# Patient Record
Sex: FEMALE | Race: WHITE | NOT HISPANIC OR LATINO
[De-identification: names, ages, dates, MRNs, and addresses within clinical notes are randomized per-mention and may not be internally consistent; named-entity substitution may affect disease eponyms.]

---

## 2019-06-26 ENCOUNTER — APPOINTMENT (OUTPATIENT)
Dept: GASTROENTEROLOGY | Facility: CLINIC | Age: 74
End: 2019-06-26
Payer: MEDICARE

## 2019-06-26 VITALS
SYSTOLIC BLOOD PRESSURE: 130 MMHG | DIASTOLIC BLOOD PRESSURE: 84 MMHG | HEIGHT: 60 IN | WEIGHT: 114 LBS | BODY MASS INDEX: 22.38 KG/M2 | OXYGEN SATURATION: 96 % | TEMPERATURE: 98.2 F | HEART RATE: 90 BPM

## 2019-06-26 DIAGNOSIS — Z87.19 PERSONAL HISTORY OF OTHER DISEASES OF THE DIGESTIVE SYSTEM: ICD-10-CM

## 2019-06-26 PROCEDURE — 99203 OFFICE O/P NEW LOW 30 MIN: CPT

## 2019-06-26 RX ORDER — LISINOPRIL 40 MG/1
40 TABLET ORAL
Refills: 0 | Status: ACTIVE | COMMUNITY

## 2019-06-26 RX ORDER — ALENDRONATE SODIUM 70 MG/1
70 TABLET ORAL
Refills: 0 | Status: ACTIVE | COMMUNITY

## 2019-06-26 RX ORDER — LACTOBACILLUS RHAMNOSUS GG 10B CELL
CAPSULE ORAL
Refills: 0 | Status: ACTIVE | COMMUNITY

## 2019-06-26 RX ORDER — LEVOTHYROXINE SODIUM 137 UG/1
TABLET ORAL
Refills: 0 | Status: ACTIVE | COMMUNITY

## 2019-06-26 RX ORDER — AMLODIPINE BESYLATE 5 MG/1
5 TABLET ORAL
Refills: 0 | Status: ACTIVE | COMMUNITY

## 2019-06-26 NOTE — ASSESSMENT
[FreeTextEntry1] : Impression and plan\par \par Patient presents to the office today with gassy bloating and uncomfortable feeling in the abdomen. I will recommend further testing in the form of CT scan to rule out unsuspected pathology pending this I will plan for colonoscopy. Patient is currently undergoing treatment for temporal arteritis and as such I would like to hold off on colonoscopy until she has completed her course of steroids. Patient will keep me posted regarding symptoms and I will update when colonoscopy is complete

## 2019-06-26 NOTE — HISTORY OF PRESENT ILLNESS
[FreeTextEntry1] : Patient came to the office today with complaints of gas and bloating abdominal distention and midline abdominal discomfort which has been going on for many weeks. Patient notes that she has some irregular bowel movements and was concerned about this. Patient denies nausea vomiting fever chills rectal bleeding or melena. She is desirous of a screening colonoscopy.

## 2019-06-26 NOTE — PHYSICAL EXAM
[General Appearance - Well Nourished] : well nourished [General Appearance - Well-Appearing] : healthy appearing [Sclera] : the sclera and conjunctiva were normal [PERRL With Normal Accommodation] : pupils were equal in size, round, and reactive to light [Extraocular Movements] : extraocular movements were intact [Outer Ear] : the ears and nose were normal in appearance [Oropharynx] : the oropharynx was normal [Neck Appearance] : the appearance of the neck was normal [Neck Cervical Mass (___cm)] : no neck mass was observed [Jugular Venous Distention Increased] : there was no jugular-venous distention [Thyroid Diffuse Enlargement] : the thyroid was not enlarged [Thyroid Nodule] : there were no palpable thyroid nodules [Auscultation Breath Sounds / Voice Sounds] : lungs were clear to auscultation bilaterally [Heart Rate And Rhythm] : heart rate was normal and rhythm regular [Heart Sounds] : normal S1 and S2 [Heart Sounds Gallop] : no gallops [Murmurs] : no murmurs [Heart Sounds Pericardial Friction Rub] : no pericardial rub [Full Pulse] : the pedal pulses are present [Edema] : there was no peripheral edema [Breast Appearance] : normal in appearance [Breast Palpation Mass] : no palpable masses [Bowel Sounds] : normal bowel sounds [Abdomen Soft] : soft [Abdomen Tenderness] : non-tender [Abdomen Mass (___ Cm)] : no abdominal mass palpated [FreeTextEntry1] : There is soft distention no masses palpated there is a suggestion of fluid wave [Cervical Lymph Nodes Enlarged Posterior Bilaterally] : posterior cervical [Cervical Lymph Nodes Enlarged Anterior Bilaterally] : anterior cervical [No CVA Tenderness] : no ~M costovertebral angle tenderness [No Spinal Tenderness] : no spinal tenderness [Abnormal Walk] : normal gait [Nail Clubbing] : no clubbing  or cyanosis of the fingernails [Musculoskeletal - Swelling] : no joint swelling seen [Motor Tone] : muscle strength and tone were normal [Skin Color & Pigmentation] : normal skin color and pigmentation [Skin Turgor] : normal skin turgor [] : no rash [Deep Tendon Reflexes (DTR)] : deep tendon reflexes were 2+ and symmetric [Sensation] : the sensory exam was normal to light touch and pinprick [No Focal Deficits] : no focal deficits [Oriented To Time, Place, And Person] : oriented to person, place, and time [Impaired Insight] : insight and judgment were intact [Affect] : the affect was normal

## 2019-07-03 ENCOUNTER — CHART COPY (OUTPATIENT)
Age: 74
End: 2019-07-03

## 2020-09-03 ENCOUNTER — APPOINTMENT (OUTPATIENT)
Dept: GASTROENTEROLOGY | Facility: CLINIC | Age: 75
End: 2020-09-03
Payer: MEDICARE

## 2020-09-03 VITALS
BODY MASS INDEX: 22.78 KG/M2 | HEIGHT: 60 IN | WEIGHT: 116 LBS | HEART RATE: 106 BPM | TEMPERATURE: 98 F | OXYGEN SATURATION: 95 % | SYSTOLIC BLOOD PRESSURE: 120 MMHG | DIASTOLIC BLOOD PRESSURE: 70 MMHG

## 2020-09-03 DIAGNOSIS — Z00.00 ENCOUNTER FOR GENERAL ADULT MEDICAL EXAMINATION W/OUT ABNORMAL FINDINGS: ICD-10-CM

## 2020-09-03 PROCEDURE — 99214 OFFICE O/P EST MOD 30 MIN: CPT

## 2020-09-03 NOTE — HISTORY OF PRESENT ILLNESS
[FreeTextEntry1] : Patient presents to the office today for evaluation and discussion of recently discovered elevated liver function testing. Patient was recently admitted and discharged from [facility] for small bowel obstruction. She underwent exploratory laparotomy with lysis of adhesions. The patient presented with a series of blood work results demonstrating normal liver function studies back in February of this year and more recently modest elevation of transaminases along with slightly elevated alkaline phosphatase. This has been persistent on sequential testing. The patient is currently asymptomatic with no complaints of nausea vomiting fever chills rectal bleeding or melena. She denies jaundice dark urine or pruritus patient has no current cardiac or pulmonary complaints. She admits to drinking 3 glasses of wine per night. There is a suggestion of fatty infiltration of the liver seen on current study not seen on previous CT one year ago.

## 2020-09-03 NOTE — ASSESSMENT
[FreeTextEntry1] : Impression\par \par Patient was recently admitted and discharged from [facility] approximately 60 days ago after admission for small bowel obstruction. She underwent exploratory laparotomy with lysis of adhesions.  She is feeling better in this regard and is healing nicely. She is noted to have elevated alkaline phosphatase and slight elevation of transaminase level on sequential blood work. Etiology is uncertain. Abdominal CT scan performed on admission did demonstrate fatty infiltration of the liver but no evidence of ductal dilatation or stone. I will investigate further with some additional blood work and obtain MRI MRCP of the abdomen to evaluate the biliary tree. Patient will call back after above testing is obtained. Further suggestions will follow. Patient was counseled to avoid alcohol

## 2020-09-04 LAB
FERRITIN SERPL-MCNC: 186 NG/ML
HBV CORE IGG+IGM SER QL: NONREACTIVE
HBV CORE IGM SER QL: NONREACTIVE
HCV AB SER QL: NONREACTIVE
HCV S/CO RATIO: 0.09 S/CO
IRON SERPL-MCNC: 144 UG/DL

## 2020-09-15 ENCOUNTER — OUTPATIENT (OUTPATIENT)
Dept: OUTPATIENT SERVICES | Facility: HOSPITAL | Age: 75
LOS: 1 days | End: 2020-09-15
Payer: MEDICARE

## 2020-09-15 ENCOUNTER — APPOINTMENT (OUTPATIENT)
Dept: MRI IMAGING | Facility: CLINIC | Age: 75
End: 2020-09-15
Payer: MEDICARE

## 2020-09-15 DIAGNOSIS — Z90.710 ACQUIRED ABSENCE OF BOTH CERVIX AND UTERUS: Chronic | ICD-10-CM

## 2020-09-15 DIAGNOSIS — Z98.89 OTHER SPECIFIED POSTPROCEDURAL STATES: Chronic | ICD-10-CM

## 2020-09-15 DIAGNOSIS — Z98.41 CATARACT EXTRACTION STATUS, RIGHT EYE: Chronic | ICD-10-CM

## 2020-09-15 DIAGNOSIS — Z00.00 ENCOUNTER FOR GENERAL ADULT MEDICAL EXAMINATION WITHOUT ABNORMAL FINDINGS: ICD-10-CM

## 2020-09-15 DIAGNOSIS — K66.0 PERITONEAL ADHESIONS (POSTPROCEDURAL) (POSTINFECTION): Chronic | ICD-10-CM

## 2020-09-15 PROCEDURE — 74183 MRI ABD W/O CNTR FLWD CNTR: CPT | Mod: 26

## 2020-09-15 PROCEDURE — A9585: CPT

## 2020-09-15 PROCEDURE — 74183 MRI ABD W/O CNTR FLWD CNTR: CPT

## 2020-09-16 LAB
ANA SER IF-ACNC: NEGATIVE
MITOCHONDRIA AB SER IF-ACNC: NORMAL

## 2020-10-01 ENCOUNTER — APPOINTMENT (OUTPATIENT)
Dept: GASTROENTEROLOGY | Facility: CLINIC | Age: 75
End: 2020-10-01
Payer: MEDICARE

## 2020-10-01 VITALS
SYSTOLIC BLOOD PRESSURE: 120 MMHG | WEIGHT: 115 LBS | BODY MASS INDEX: 22.58 KG/M2 | DIASTOLIC BLOOD PRESSURE: 80 MMHG | TEMPERATURE: 97.9 F | HEIGHT: 60 IN | HEART RATE: 84 BPM | OXYGEN SATURATION: 96 %

## 2020-10-01 PROCEDURE — 99213 OFFICE O/P EST LOW 20 MIN: CPT

## 2020-10-01 NOTE — ASSESSMENT
[FreeTextEntry1] : Impression and plan patient returns for follow up and discussion. Since her last visit she has been feeling well and is without complaint. There has been a slight drop in her transaminase level and alkaline phosphatase is near normal. MRI scan was reviewed including small pancreatic cyst that will require surveillance in about a year or so. Patient will see me back in about a month for ongoing blood work. At this point there is no obvious causation for her elevated transaminase levels. She continues to hold cholesterol medication and avoids alcohol. She takes no over-the-counter medications or supplements. Further suggestions in one month and hepatology visit may be required if continued elevation.

## 2020-11-03 ENCOUNTER — LABORATORY RESULT (OUTPATIENT)
Age: 75
End: 2020-11-03

## 2020-11-03 ENCOUNTER — APPOINTMENT (OUTPATIENT)
Dept: GASTROENTEROLOGY | Facility: CLINIC | Age: 75
End: 2020-11-03
Payer: MEDICARE

## 2020-11-03 VITALS
SYSTOLIC BLOOD PRESSURE: 150 MMHG | HEART RATE: 90 BPM | BODY MASS INDEX: 23.36 KG/M2 | DIASTOLIC BLOOD PRESSURE: 80 MMHG | OXYGEN SATURATION: 96 % | WEIGHT: 119 LBS | TEMPERATURE: 98 F | HEIGHT: 60 IN

## 2020-11-03 DIAGNOSIS — Z87.39 PERSONAL HISTORY OF OTHER DISEASES OF THE MUSCULOSKELETAL SYSTEM AND CONNECTIVE TISSUE: ICD-10-CM

## 2020-11-03 PROCEDURE — 99213 OFFICE O/P EST LOW 20 MIN: CPT

## 2020-11-03 NOTE — HISTORY OF PRESENT ILLNESS
[FreeTextEntry1] : Patient returned for followup and discussion. She is feeling well offers no complaints. She has discontinued her cholesterol medication. She currently denies nausea vomiting fever chills rectal bleeding or melena. We took time to review recent MRI demonstrating small cyst repeat blood work will be drawn today.

## 2020-11-03 NOTE — ASSESSMENT
[FreeTextEntry1] : Impression and\par \par Patient came to the office today for followup. I will recheck blood work. Patient feels well and offers no present complaints. Pancreatic cyst is small and appears to be benign based upon MRI report. However I will recommend surgical consultation with Dr. Bam Arizmendi at [facility]. Patient will make an appointment at her convenience and call back for results of blood work drawn today.

## 2020-11-04 ENCOUNTER — NON-APPOINTMENT (OUTPATIENT)
Age: 75
End: 2020-11-04

## 2020-11-04 LAB
ALBUMIN SERPL ELPH-MCNC: 4.7 G/DL
ALP BLD-CCNC: 198 U/L
ALT SERPL-CCNC: 75 U/L
ANION GAP SERPL CALC-SCNC: 12 MMOL/L
AST SERPL-CCNC: 24 U/L
BILIRUB SERPL-MCNC: 0.4 MG/DL
BUN SERPL-MCNC: 15 MG/DL
CALCIUM SERPL-MCNC: 9.6 MG/DL
CHLORIDE SERPL-SCNC: 103 MMOL/L
CO2 SERPL-SCNC: 26 MMOL/L
CREAT SERPL-MCNC: 0.69 MG/DL
GLUCOSE SERPL-MCNC: 124 MG/DL
POTASSIUM SERPL-SCNC: 4.5 MMOL/L
PROT SERPL-MCNC: 6.5 G/DL
SODIUM SERPL-SCNC: 141 MMOL/L

## 2020-11-05 ENCOUNTER — NON-APPOINTMENT (OUTPATIENT)
Age: 75
End: 2020-11-05

## 2021-02-02 ENCOUNTER — NON-APPOINTMENT (OUTPATIENT)
Age: 76
End: 2021-02-02

## 2021-02-10 ENCOUNTER — NON-APPOINTMENT (OUTPATIENT)
Age: 76
End: 2021-02-10

## 2021-03-04 ENCOUNTER — NON-APPOINTMENT (OUTPATIENT)
Age: 76
End: 2021-03-04

## 2022-07-12 ENCOUNTER — APPOINTMENT (OUTPATIENT)
Dept: HEPATOLOGY | Facility: CLINIC | Age: 77
End: 2022-07-12

## 2022-07-12 ENCOUNTER — LABORATORY RESULT (OUTPATIENT)
Age: 77
End: 2022-07-12

## 2022-07-12 VITALS
TEMPERATURE: 97.5 F | HEIGHT: 60 IN | HEART RATE: 99 BPM | SYSTOLIC BLOOD PRESSURE: 120 MMHG | BODY MASS INDEX: 23.75 KG/M2 | DIASTOLIC BLOOD PRESSURE: 90 MMHG | OXYGEN SATURATION: 96 % | WEIGHT: 121 LBS

## 2022-07-12 PROCEDURE — 99215 OFFICE O/P EST HI 40 MIN: CPT

## 2022-07-12 RX ORDER — VITAMIN A 10000 UNIT
TABLET ORAL
Refills: 0 | Status: ACTIVE | COMMUNITY

## 2022-07-12 RX ORDER — PREDNISONE 10 MG
TABLET ORAL
Refills: 0 | Status: ACTIVE | COMMUNITY

## 2022-07-12 RX ORDER — UMECLIDINIUM BROMIDE AND VILANTEROL TRIFENATATE 62.5; 25 UG/1; UG/1
62.5-25 POWDER RESPIRATORY (INHALATION)
Qty: 60 | Refills: 0 | Status: ACTIVE | COMMUNITY
Start: 2022-06-02

## 2022-07-12 RX ORDER — ATORVASTATIN CALCIUM 40 MG/1
40 TABLET, FILM COATED ORAL
Refills: 0 | Status: DISCONTINUED | COMMUNITY
End: 2022-07-12

## 2022-07-12 RX ORDER — PREDNISOLONE ACETATE 10 MG/ML
1 SUSPENSION/ DROPS OPHTHALMIC
Qty: 5 | Refills: 0 | Status: DISCONTINUED | COMMUNITY
Start: 2022-02-25

## 2022-07-12 RX ORDER — LISINOPRIL 20 MG/1
20 TABLET ORAL
Qty: 180 | Refills: 0 | Status: DISCONTINUED | COMMUNITY
Start: 2021-08-05

## 2022-07-12 RX ORDER — ASCORBIC ACID 500 MG
TABLET ORAL
Refills: 0 | Status: ACTIVE | COMMUNITY

## 2022-07-12 RX ORDER — UBIDECARENONE/VIT E ACET 100MG-5
CAPSULE ORAL
Refills: 0 | Status: ACTIVE | COMMUNITY

## 2022-07-12 RX ORDER — PANTOPRAZOLE 40 MG/1
40 TABLET, DELAYED RELEASE ORAL
Refills: 0 | Status: DISCONTINUED | COMMUNITY
End: 2022-07-12

## 2022-07-12 NOTE — ASSESSMENT
[FreeTextEntry1] : Kavita Chin 76 yoF with hypothyroidism, HLD, HTN, polymyalgia rheumatica and temporal arteritis   here for evaluation of elevated liver enzymes \par \par #Elevated liver enzymes\par -intermittent elevation of transaminases along with mildly elevated alkaline phosphatase since June 2020, was normal prior.   Recently had BW on 6/2/22 ALT 88, AST 57,  when they were normal in March 2022. Abdo CT July 2020 suggestive of fatty infiltration of the liver however not seen on abdo MRI from Sept 2020. No new meds or supplement in the last 6 months. She drinks 2-3 glasses of wine daily for about 25 yrs.\par - I have recommended that we do a complete liver evaluation \par -MRE ordered for non-invasive estimation of degree of steatosis and stage of fibrosis \par \par #Pancreatic cyst \par Abdo MRI 9/15/20 showed 0.7 cm cystic lesion in the uncinate process. Recommended repeat MRCP.  \par \par Plan:\par BW today, MRCP and MRE, F/U in 3-4 weeks \par \par

## 2022-07-12 NOTE — PHYSICAL EXAM
[Scleral Icterus] : No Scleral Icterus [Abdominal  Ascites] : no ascites [Asterixis] : no asterixis observed [Jaundice] : No jaundice [Palmar Erythema] : no Palmar Erythema [General Appearance - Alert] : alert [General Appearance - In No Acute Distress] : in no acute distress [Sclera] : the sclera and conjunctiva were normal [Neck Appearance] : the appearance of the neck was normal [Neck Cervical Mass (___cm)] : no neck mass was observed [] : no respiratory distress [Respiration, Rhythm And Depth] : normal respiratory rhythm and effort [Auscultation Breath Sounds / Voice Sounds] : lungs were clear to auscultation bilaterally [Heart Rate And Rhythm] : heart rate was normal and rhythm regular [Heart Sounds] : normal S1 and S2 [Edema] : there was no peripheral edema [Bowel Sounds] : normal bowel sounds [Abdomen Soft] : soft [Abdomen Tenderness] : non-tender [Nail Clubbing] : no clubbing  or cyanosis of the fingernails [Skin Turgor] : normal skin turgor [Oriented To Time, Place, And Person] : oriented to person, place, and time

## 2022-07-12 NOTE — REVIEW OF SYSTEMS
[Fever] : no fever [Chills] : no chills [Abdominal Pain] : no abdominal pain [Melena] : no melena [Dysuria] : no dysuria [Arthralgias] : arthralgias [Itching] : no itching [Confused] : no confusion [Negative] : Respiratory

## 2022-07-12 NOTE — HISTORY OF PRESENT ILLNESS
[de-identified] : Kavita Chin 76 yoF with hypothyroidism, HLD, HTN, polymyalgia rheumatica and temporal arteritis   here for evaluation of elevated liver enzymes referred by Dr. Michael Mckee. She has intermittent elevation of transaminases along with mildly elevated alkaline phosphatase since June 2020, was normal prior. She was advised in 2020 to stop Lipitor with improvement.  Recently had BW on 6/2/22 ALT 88, AST 57,  when they were normal in March 2022. Past BW showed normal AMA, OTTO, ceruloplasmin, Was neg for hep B and C. . \par \par No recent illness. Meds/supplements reviewed and listed. No new meds in the last 6 months. Was taking prednisone 0.5 mg daily for temporal arteritis but was increased to 20 mg daily in May now at 5 mg daily.  Former smoker. No recreational drug use. No family h/o liver disease or liver cancer.

## 2022-07-13 LAB
A1AT SERPL-MCNC: 94 MG/DL
AFP-TM SERPL-MCNC: 13.3 NG/ML
ALBUMIN SERPL ELPH-MCNC: 4.7 G/DL
ALP BLD-CCNC: 118 U/L
ALT SERPL-CCNC: 24 U/L
ANION GAP SERPL CALC-SCNC: 16 MMOL/L
AST SERPL-CCNC: 17 U/L
BASOPHILS # BLD AUTO: 0.05 K/UL
BASOPHILS NFR BLD AUTO: 0.5 %
BILIRUB DIRECT SERPL-MCNC: 0.1 MG/DL
BILIRUB SERPL-MCNC: 0.3 MG/DL
BUN SERPL-MCNC: 27 MG/DL
CALCIUM SERPL-MCNC: 10.2 MG/DL
CERULOPLASMIN SERPL-MCNC: 27 MG/DL
CHLORIDE SERPL-SCNC: 105 MMOL/L
CO2 SERPL-SCNC: 23 MMOL/L
CREAT SERPL-MCNC: 0.78 MG/DL
EGFR: 78 ML/MIN/1.73M2
EOSINOPHIL # BLD AUTO: 0.07 K/UL
EOSINOPHIL NFR BLD AUTO: 0.7 %
FERRITIN SERPL-MCNC: 395 NG/ML
GGT SERPL-CCNC: 177 U/L
HBV CORE IGG+IGM SER QL: NONREACTIVE
HBV SURFACE AB SER QL: NONREACTIVE
HBV SURFACE AG SER QL: NONREACTIVE
HCT VFR BLD CALC: 49 %
HCV AB SER QL: NONREACTIVE
HCV S/CO RATIO: 0.08 S/CO
HEPATITIS A IGG ANTIBODY: REACTIVE
HGB BLD-MCNC: 15.9 G/DL
IGA SER QL IEP: 185 MG/DL
IGG SER QL IEP: 615 MG/DL
IGM SER QL IEP: 119 MG/DL
IMM GRANULOCYTES NFR BLD AUTO: 0.2 %
INR PPP: 0.87 RATIO
IRON SATN MFR SERPL: 52 %
IRON SERPL-MCNC: 142 UG/DL
LYMPHOCYTES # BLD AUTO: 1.25 K/UL
LYMPHOCYTES NFR BLD AUTO: 12.7 %
MAN DIFF?: NORMAL
MCHC RBC-ENTMCNC: 32.4 GM/DL
MCHC RBC-ENTMCNC: 34.5 PG
MCV RBC AUTO: 106.3 FL
MONOCYTES # BLD AUTO: 0.74 K/UL
MONOCYTES NFR BLD AUTO: 7.5 %
NEUTROPHILS # BLD AUTO: 7.68 K/UL
NEUTROPHILS NFR BLD AUTO: 78.4 %
PLATELET # BLD AUTO: 360 K/UL
POTASSIUM SERPL-SCNC: 4.1 MMOL/L
PROT SERPL-MCNC: 6.9 G/DL
PT BLD: 10.1 SEC
RBC # BLD: 4.61 M/UL
RBC # FLD: 14 %
SODIUM SERPL-SCNC: 144 MMOL/L
TIBC SERPL-MCNC: 272 UG/DL
UIBC SERPL-MCNC: 130 UG/DL
WBC # FLD AUTO: 9.81 K/UL

## 2022-07-14 LAB
ACE BLD-CCNC: <5 U/L
ANA SER IF-ACNC: NEGATIVE
LKM AB SER QL IF: <20.1 UNITS
MITOCHONDRIA AB SER IF-ACNC: NORMAL
SMOOTH MUSCLE AB SER QL IF: NORMAL

## 2022-07-18 LAB — PHOSPHATIDYETHANOL (PETH), WHOLE BLOOD: 203 NG/ML

## 2022-07-19 LAB — SOLUBLE LIVER IGG SER IA-ACNC: 0.5

## 2022-07-20 LAB
HEPATITIS E IGM ABY: NOT DETECTED
TM INTERPRETATION: NORMAL

## 2022-08-08 ENCOUNTER — APPOINTMENT (OUTPATIENT)
Dept: MRI IMAGING | Facility: IMAGING CENTER | Age: 77
End: 2022-08-08

## 2022-08-08 ENCOUNTER — OUTPATIENT (OUTPATIENT)
Dept: OUTPATIENT SERVICES | Facility: HOSPITAL | Age: 77
LOS: 1 days | End: 2022-08-08
Payer: MEDICARE

## 2022-08-08 DIAGNOSIS — Z90.710 ACQUIRED ABSENCE OF BOTH CERVIX AND UTERUS: Chronic | ICD-10-CM

## 2022-08-08 DIAGNOSIS — Z98.89 OTHER SPECIFIED POSTPROCEDURAL STATES: Chronic | ICD-10-CM

## 2022-08-08 DIAGNOSIS — Z98.41 CATARACT EXTRACTION STATUS, RIGHT EYE: Chronic | ICD-10-CM

## 2022-08-08 DIAGNOSIS — R74.8 ABNORMAL LEVELS OF OTHER SERUM ENZYMES: ICD-10-CM

## 2022-08-08 DIAGNOSIS — K66.0 PERITONEAL ADHESIONS (POSTPROCEDURAL) (POSTINFECTION): Chronic | ICD-10-CM

## 2022-08-08 PROCEDURE — 74183 MRI ABD W/O CNTR FLWD CNTR: CPT | Mod: 26,MH

## 2022-08-08 PROCEDURE — A9585: CPT

## 2022-08-08 PROCEDURE — 76391 MR ELASTOGRAPHY: CPT

## 2022-08-08 PROCEDURE — 76391 MR ELASTOGRAPHY: CPT | Mod: 26

## 2022-08-08 PROCEDURE — 74183 MRI ABD W/O CNTR FLWD CNTR: CPT

## 2022-08-12 ENCOUNTER — APPOINTMENT (OUTPATIENT)
Dept: HEPATOLOGY | Facility: CLINIC | Age: 77
End: 2022-08-12

## 2022-08-12 VITALS
TEMPERATURE: 96.9 F | HEART RATE: 89 BPM | HEIGHT: 60 IN | OXYGEN SATURATION: 95 % | DIASTOLIC BLOOD PRESSURE: 80 MMHG | WEIGHT: 121.2 LBS | BODY MASS INDEX: 23.8 KG/M2 | SYSTOLIC BLOOD PRESSURE: 128 MMHG

## 2022-08-12 DIAGNOSIS — R79.89 OTHER SPECIFIED ABNORMAL FINDINGS OF BLOOD CHEMISTRY: ICD-10-CM

## 2022-08-12 DIAGNOSIS — R74.8 ABNORMAL LEVELS OF OTHER SERUM ENZYMES: ICD-10-CM

## 2022-08-12 DIAGNOSIS — R77.2 ABNORMALITY OF ALPHAFETOPROTEIN: ICD-10-CM

## 2022-08-12 PROCEDURE — 99213 OFFICE O/P EST LOW 20 MIN: CPT

## 2022-08-12 NOTE — HISTORY OF PRESENT ILLNESS
[de-identified] : Kavita Chin 76 yoF with hypothyroidism, HLD, HTN, polymyalgia rheumatica and temporal arteritis here to discuss recent liver workup for elevated liver enzymes \par \par 8/12/22: No new complaints. Has stopped drinking alcohol since she was last seen in July. \par \par 7/12/22 Initial visit: here for evaluation of elevated liver enzymes referred by Dr. Michael Mckee. She has intermittent elevation of transaminases along with mildly elevated alkaline phosphatase since June 2020, was normal prior. She was advised in 2020 to stop Lipitor with improvement.  Recently had BW on 6/2/22 ALT 88, AST 57,  when they were normal in March 2022. Past BW showed normal AMA, OTTO, ceruloplasmin, Was neg for hep B and C. . \par \par No recent illness. Meds/supplements reviewed and listed. No new meds in the last 6 months. Was taking prednisone 0.5 mg daily for temporal arteritis but was increased to 20 mg daily in May now at 5 mg daily. \par \par  Former smoker. No recreational drug use. She drinks 2-3 glasses of wine daily for about 25 yrs. No family h/o liver disease or liver cancer.

## 2022-08-12 NOTE — REVIEW OF SYSTEMS
[Arthralgias] : arthralgias [Negative] : Heme/Lymph [Fever] : no fever [Chills] : no chills [Abdominal Pain] : no abdominal pain [Melena] : no melena [Dysuria] : no dysuria [Itching] : no itching [Confused] : no confusion

## 2022-08-12 NOTE — ASSESSMENT
[FreeTextEntry1] : Kavita Corroon 76 yoF with hypothyroidism, HLD, HTN, polymyalgia rheumatica and temporal arteritis here to discuss recent liver evaluation for elevated liver enzymes \par \par #Elevated liver enzymes\par -intermittent elevation of transaminases along with mildly elevated alkaline phosphatase since 2020, was normal prior.    BW on 22 ALT 88, AST 57,  when they were normal in 2022. Liver workup from 22 showed normalization of liver enzymes without intervention. 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 Ferritin 395, TIBC 52%. MRE showed no iron deposition and no fibrosis. Discussed phlebotomy but patient doesn't want it so will continue to monitor. Advised to avoid supplemental iron, MVT with iron and foods fortified with iron. \par \par #Elevated AFP 13.3. MRI 22 showed 1 cm hepatic cyst, no suspicious lesion. Will repeat AFP in 3 months. \par \par #Pancreatic cyst \par Abdo MRI 9/15/20 showed 0.7 cm cystic lesion in the uncinate process. MRI 22 now showed two small pancreatic cysts, 7mm and 3 mm and mildly dilated side branch duct. Reports that her brother  of pancreatic ca. She was recommended by Dr. Michael Mckee in  surgical consultation but never did. I recommended that she be seen at our pancreatic center and she is amendable to this. Will contact our pancreatic team today. \par \par #Hepatic steatosis\par Has normal BMI. PEth elevated. Potentially related to alcohol. She drank 2-3 glasses of wine daily for about 25 yrs but reports that she has stopped since July. No fibrosis on MRE\par \par \par Plan:\par Repeat BW in 3 months and she will call me to discuss results. RTC 6 months. \par \par

## 2022-09-08 ENCOUNTER — APPOINTMENT (OUTPATIENT)
Dept: SURGICAL ONCOLOGY | Facility: CLINIC | Age: 77
End: 2022-09-08

## 2022-09-08 VITALS
HEIGHT: 59.5 IN | HEART RATE: 95 BPM | SYSTOLIC BLOOD PRESSURE: 132 MMHG | RESPIRATION RATE: 16 BRPM | OXYGEN SATURATION: 97 % | DIASTOLIC BLOOD PRESSURE: 79 MMHG | BODY MASS INDEX: 24.87 KG/M2 | TEMPERATURE: 97.7 F | WEIGHT: 125 LBS

## 2022-09-08 PROCEDURE — 99204 OFFICE O/P NEW MOD 45 MIN: CPT

## 2022-09-08 NOTE — ASSESSMENT
[FreeTextEntry1] : 76F referred for management of small pancreatic cysts. \par 7mm and 3 mm on MRI Aug 2022\par \par I discussed that pancreatic cysts can have a range of pathologies and although most are benign, there is a risk of malignancy. We discussed that the relatively small size, absence of main duct involvement, and lack of solid components of her cysts are reassuring, but there is still a small risk developing pancreatic cancer. This risk warrants continued surveillance as long as she is healthy enough to pursue any therapy- such as resection, chemotherapy, or radiation. \par \par In addition, her risk may be elevated based on first degree family members with pancreatic cancer (brother). We discussed that she meets criteria for genetic testing based on a first degree relative. If a germline mutation was identified recommendation would still be continued annual surveillance, and so would not necessarily change the intensity of her surveillance. \par \par I discussed symptoms that would raise my concern for malignant transformation including weight loss, jaundice, abdominal/ back pain, and new onset diabetes. Should these develop, she should call immediately. \par \par In the absence of new symptoms I recommend continued surveillance with annual MRI. I discussed that at some point in the future it may be worthwhile to consider endoscopic ultrasound as this modality may identify findings not apparent on MRI- such as occult mural nodules. I do not think this is necessary in the near future. I also discussed that our surveillance recommendations may change over time as the literature continues to evolve. \par \par Plan: \par Genetics referral \par Follow up with MRI in 1 year\par Follow up cyst clinic after MRI\par \par

## 2022-09-08 NOTE — HISTORY OF PRESENT ILLNESS
[de-identified] : 76F referred by Dr. Michelle Puente for management of small pancreatic cysts. \par ** Family history of pancreatic cancer in her brother-- diagnosed at age 78.\par Did not undergo genetic testing. \par \par She follows with hepatology for elevated liver enzymes. \par Recently stopped drinking alcohol. \par Hep B and C negative. \par \par MRI abdomen 9/15/20 0.7 cm cystic lesion uncinate process of pancreas\par MRI 8/8/22 2 small pancreatic cysts- 7mm, 3mm and mildly dilated side branch \par \par She takes prednisone for temporal arteritis. Has developed central obesity. She is down to 1 mg. Has to increase with flares. \par \par PMHx: \par hypothyroid\par hypertension\par hyperlipidemia\par ploymyalgia rheumatic \par foot trauma\par \par PSHx: \par Hysterectomy\par Ex lap X 2 for small bowel obstructive secondary to adhesions\par \par Medications: \par amodipine\par lisinopril\par Prednisone\par Synthroid\par Addiitonal medications reviewed

## 2022-10-20 ENCOUNTER — OUTPATIENT (OUTPATIENT)
Dept: OUTPATIENT SERVICES | Facility: HOSPITAL | Age: 77
LOS: 1 days | Discharge: HOME | End: 2022-10-20

## 2022-10-20 DIAGNOSIS — K66.0 PERITONEAL ADHESIONS (POSTPROCEDURAL) (POSTINFECTION): Chronic | ICD-10-CM

## 2022-10-20 DIAGNOSIS — Z98.89 OTHER SPECIFIED POSTPROCEDURAL STATES: Chronic | ICD-10-CM

## 2022-10-20 DIAGNOSIS — Z15.89 GENETIC SUSCEPTIBILITY TO OTHER DISEASE: ICD-10-CM

## 2022-10-20 DIAGNOSIS — Z90.710 ACQUIRED ABSENCE OF BOTH CERVIX AND UTERUS: Chronic | ICD-10-CM

## 2022-10-20 DIAGNOSIS — Z98.41 CATARACT EXTRACTION STATUS, RIGHT EYE: Chronic | ICD-10-CM

## 2022-11-08 ENCOUNTER — APPOINTMENT (OUTPATIENT)
Dept: HEMATOLOGY ONCOLOGY | Facility: CLINIC | Age: 77
End: 2022-11-08

## 2022-11-08 ENCOUNTER — LABORATORY RESULT (OUTPATIENT)
Age: 77
End: 2022-11-08

## 2022-11-10 NOTE — DISCUSSION/SUMMARY
[FreeTextEntry1] : REASON FOR CONSULT\par Kavita Chin is a 77-year-old female who was referred by Dr. Beverly Bustos for cancer genetic counseling and risk assessment due to a family history of pancreatic and other cancers. \par \par RELEVANT MEDICAL HISTORY\par Ms. Mo is a healthy individual who has never had cancer. Of note, she was found to have small pancreatic cysts on her recent MRI abdomen (2022). In addition, she has a family history of cancer, see below.\par \par OTHER MEDICAL AND SURGICAL HISTORY:\par Hysterectomy d/t heavy bleeding (; ovaries intact). Hypothyroidism. HTN. HLD. Crohn’s (). Right thyroidectomy (reported benign; ). Small bowel obstruction, ex lap x2. D&C x2. \par \par PAST OB/GYN HISTORY:\par Obstetrical History: \par Age at Menarche:11\par Menopausal with LMP at age 42 (after hysterectomy ). \par Age at First Live Birth: 21\par Contraceptive Use: Yes, oral for 2 years and IUD for 6 months. \par Hormone Replacement Therapy: No\par \par CANCER SCREENING HISTORY:  \par Breast: Last mammogram 2021, normal. Frequency: yearly. \par GYN: Last visit over 5 years ago, normal. \par Colon: Last colonoscopy , one noncancerous polyps reported. H/o one colonoscopy reportedly found 30 noncancerous polyps in ~. \par Skin: Last exam one year ago, noncancerous lesion reportedly removed from face. 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 Frequency: as needed. \par \par SOCIAL HISTORY:\par •	Tobacco-product use: Yes, formerly, quit 5 years ago. Smoked pack/day starting in her 20s. \par \par FAMILY HISTORY:\par Maternal ancestry was reported as English, [place] and Greek, and paternal ancestry was reported as [place]. No Ashkenazi Quaker heritage reported. A detailed family history of cancer was ascertained, see below and scanned chart for pedigree. \par \par To Ms. Chin’s knowledge, no one in the family has had germline testing for cancer susceptibility.  \par 	\par 	RISK ASSESSMENT:\par Ms. Chin’s personal history of multiple colon polyps and family history of pancreatic cancer and melanoma is suggestive of more than one hereditary cancer predisposition syndrome. We recommended genetic testing for pancreatic cancer panel, melanoma panel and guidelines-based colorectal cancer panel.  This test analyzes 34 genes: APC, CÉSAR, AXIN2, BAP1, BMPR1A, BRCA1, BRCA2, CDK4, CDKN2A, CHEK2, EPCAM, GREM1, MEN1, MITF, MLH1, MSH2, MSH3, MSH6, MUTYH, NF1, NTHL1, PALB2, PMS2, POLD1, POLE, POT1, PTEN, RB1, SMAD4, STK11, TP53, TSC1, TSC2, VHL.\par \par We discussed the risks, benefits and limitations, and implications of genetic testing. We also discussed the psychosocial implications of genetic testing. Possible test results were reviewed with Ms. Chin, along with associated medical management options. The Genetic Information Non-discrimination Act (LAUREL) was also reviewed.\par \par Ms. Chin consented to the above-mentioned genetic testing panel. Blood was drawn in our laboratory and sent to [facility] today.\par \par PLAN:\par \par 1.	Blood drawn today will be sent to [facility] for analysis. \par 2.	We will contact Ms. Chin to schedule a follow-up appointment once the results are available. Results generally return in 2-3 weeks. \par \par For any additional questions please call [facility] at [phone]. \par \par \par Huber Carlson, MS, [facility] [place]\par Genetic Counselor, [facility]\par \par \par CC: \par Dr. Beverly Bustos

## 2022-12-21 ENCOUNTER — NON-APPOINTMENT (OUTPATIENT)
Age: 77
End: 2022-12-21

## 2023-01-06 LAB
ALBUMIN SERPL ELPH-MCNC: 4.4 G/DL
ALP BLD-CCNC: 114 U/L
ALT SERPL-CCNC: 27 U/L
ANION GAP SERPL CALC-SCNC: 15 MMOL/L
AST SERPL-CCNC: 22 U/L
BASOPHILS # BLD AUTO: 0.05 K/UL
BASOPHILS NFR BLD AUTO: 0.6 %
BILIRUB SERPL-MCNC: 0.4 MG/DL
BUN SERPL-MCNC: 14 MG/DL
CALCIUM SERPL-MCNC: 9.2 MG/DL
CHLORIDE SERPL-SCNC: 104 MMOL/L
CO2 SERPL-SCNC: 23 MMOL/L
CREAT SERPL-MCNC: 0.78 MG/DL
EGFR: 77 ML/MIN/1.73M2
EOSINOPHIL # BLD AUTO: 0.15 K/UL
EOSINOPHIL NFR BLD AUTO: 1.9 %
FERRITIN SERPL-MCNC: 465 NG/ML
HCT VFR BLD CALC: 45.6 %
HGB BLD-MCNC: 15.1 G/DL
IMM GRANULOCYTES NFR BLD AUTO: 0.4 %
IRON SATN MFR SERPL: 55 %
IRON SERPL-MCNC: 133 UG/DL
LYMPHOCYTES # BLD AUTO: 1.72 K/UL
LYMPHOCYTES NFR BLD AUTO: 21.4 %
MAN DIFF?: NORMAL
MCHC RBC-ENTMCNC: 32.4 PG
MCHC RBC-ENTMCNC: 33.1 GM/DL
MCV RBC AUTO: 97.9 FL
MONOCYTES # BLD AUTO: 0.68 K/UL
MONOCYTES NFR BLD AUTO: 8.5 %
NEUTROPHILS # BLD AUTO: 5.39 K/UL
NEUTROPHILS NFR BLD AUTO: 67.2 %
PLATELET # BLD AUTO: 419 K/UL
POTASSIUM SERPL-SCNC: 5.3 MMOL/L
PROT SERPL-MCNC: 6.6 G/DL
RBC # BLD: 4.66 M/UL
RBC # FLD: 13.9 %
SODIUM SERPL-SCNC: 142 MMOL/L
TIBC SERPL-MCNC: 240 UG/DL
UIBC SERPL-MCNC: 107 UG/DL
WBC # FLD AUTO: 8.02 K/UL

## 2023-01-10 ENCOUNTER — NON-APPOINTMENT (OUTPATIENT)
Age: 78
End: 2023-01-10

## 2023-01-10 LAB
ALPHA-1-FETOPROTEIN-L3: NORMAL %
ALPHA-1-FETOPROTEIN: 5.9 NG/ML

## 2023-02-06 NOTE — DISCUSSION/SUMMARY
[FreeTextEntry1] : REASON FOR CONSULT\par Kavita Chin is a 77-year-old female who was called 11/21/2022 for a discussion regarding her negative genetic testing results related to hereditary cancer predisposition. \par \par Ms. Chin was originally seen at [facility] on 11/08/2022 for hereditary cancer predisposition risk assessment. Ms. Chin has no personal history of cancer but was recently found to have pancreatic cysts and has a personal history of colon polyps. She also has a family history of pancreatic cancer and melanoma. Ms. Chin decided to pursue genetic testing using a pancreatic cancer panel, melanoma panel and guidelines-based colorectal cancer panel (34 genes) offered by [facility]. \par \par TEST RESULTS: NEGATIVE\par \par NO pathogenic (disease-causing) variants or VUSs were detected in the following genes: APC*, CÉSAR*, AXIN2, BAP1, BMPR1A, BRCA1, BRCA2, CDK4, CDKN2A (p14ARF), CDKN2A ([id number]), CHEK2, EPCAM*, GREM1*, MEN1*, MITF*, MLH1*, MSH2*, MSH3*, MSH6*, MUTYH, NF1*, NTHL1, PALB2, PMS2*, POLD1*, POLE, POT1, PTEN*, RB1*, SMAD4, STK11, TP53, TSC1*, TSC2, VHL\par \par RESULTS INTERPRETATION AND ASSESSMENT:\par Given Ms. Chin’s personal and current reported family history of cancer, her negative genetic test results, and in the absence of other indications, she should practice age-appropriate cancer screening of other organ systems as recommended for the general population.\par \par We also discussed the limitations of negative results:\par 1.	The cause of Ms. Chin’s family history of cancer remains unknown. The cancer(s) may have developed randomly, or due to environmental factors.  \par 2.	This negative result does not completely rule out a hereditary basis for the reported personal and/or family history due to limitations in technology or a variant being present in an unidentified gene. \par 3.	Variants in other genes would not be identified by this analysis, so this negative result does not rule out the low likelihood of having a mutation in a different hereditary cancer gene or the possibility of ever developing cancer.\par 4.	It is possible there is a hereditary cancer predisposition gene mutation in the family, but the patient did not inherit it. \par \par We informed Ms. Chin that our knowledge of genetics and inherited cancer conditions is changing rapidly. Therefore, we recommended that Ms. Chin contact our office, every 2 to 3 years, to discuss relevant advances in cancer genetics.  We emphasized the importance of re-contacting us with updates regarding her personal and family history of cancer as well as any updates regarding additional cancer genetic test results performed for the patient and/or family members.  Such updates could possibly change our risk assessment and recommendations. \par \par In addition, we discussed Ms. Chin’s sister may consider pursuing cancer risk assessment genetic counseling with the option of genetic testing given their family history of pancreatic cancer and melanoma. \par \par PLAN:\par 1.	These results do not change Ms. Chin’s medical management. \par 2.	Patient informed consult note(s) will be available through their [facility] patient portal and genetic test results will be released via [facility]’s laboratory portal. \par 3.	Ms. Chin was encouraged to contact us every 2-3 years to discuss relevant advances in cancer genetics, or sooner if there are any changes in her personal or family history of cancer.\par \par \par For any additional questions please call [facility] at [phone]. \par \par \par Huber Carlson MS, [facility] [place]\par Genetic Counselor, Cancer [facility]\par \par \par CC: \par Patient\par Dr. Beverly Bustos

## 2023-02-07 ENCOUNTER — APPOINTMENT (OUTPATIENT)
Dept: GASTROENTEROLOGY | Facility: CLINIC | Age: 78
End: 2023-02-07

## 2023-08-09 ENCOUNTER — APPOINTMENT (OUTPATIENT)
Dept: MRI IMAGING | Facility: CLINIC | Age: 78
End: 2023-08-09
Payer: MEDICARE

## 2023-08-09 ENCOUNTER — OUTPATIENT (OUTPATIENT)
Dept: OUTPATIENT SERVICES | Facility: HOSPITAL | Age: 78
LOS: 1 days | End: 2023-08-09
Payer: MEDICARE

## 2023-08-09 DIAGNOSIS — Z90.710 ACQUIRED ABSENCE OF BOTH CERVIX AND UTERUS: Chronic | ICD-10-CM

## 2023-08-09 DIAGNOSIS — Z98.89 OTHER SPECIFIED POSTPROCEDURAL STATES: Chronic | ICD-10-CM

## 2023-08-09 DIAGNOSIS — Z98.41 CATARACT EXTRACTION STATUS, RIGHT EYE: Chronic | ICD-10-CM

## 2023-08-09 DIAGNOSIS — K86.2 CYST OF PANCREAS: ICD-10-CM

## 2023-08-09 DIAGNOSIS — K66.0 PERITONEAL ADHESIONS (POSTPROCEDURAL) (POSTINFECTION): Chronic | ICD-10-CM

## 2023-08-09 DIAGNOSIS — Z00.00 ENCOUNTER FOR GENERAL ADULT MEDICAL EXAMINATION WITHOUT ABNORMAL FINDINGS: ICD-10-CM

## 2023-08-09 PROCEDURE — 74183 MRI ABD W/O CNTR FLWD CNTR: CPT | Mod: 26,MH

## 2023-08-09 PROCEDURE — 74183 MRI ABD W/O CNTR FLWD CNTR: CPT

## 2023-08-09 PROCEDURE — A9585: CPT

## 2023-09-07 PROBLEM — K86.2 PANCREATIC CYST: Status: ACTIVE | Noted: 2022-07-12

## 2023-09-08 ENCOUNTER — APPOINTMENT (OUTPATIENT)
Dept: SURGICAL ONCOLOGY | Facility: CLINIC | Age: 78
End: 2023-09-08
Payer: MEDICARE

## 2023-09-08 VITALS
HEIGHT: 59.5 IN | DIASTOLIC BLOOD PRESSURE: 81 MMHG | HEART RATE: 85 BPM | OXYGEN SATURATION: 96 % | SYSTOLIC BLOOD PRESSURE: 148 MMHG | RESPIRATION RATE: 17 BRPM | WEIGHT: 129 LBS | BODY MASS INDEX: 25.66 KG/M2

## 2023-09-08 DIAGNOSIS — K86.2 CYST OF PANCREAS: ICD-10-CM

## 2023-09-08 PROCEDURE — 99213 OFFICE O/P EST LOW 20 MIN: CPT

## 2023-09-08 NOTE — HISTORY OF PRESENT ILLNESS
[de-identified] : 77F referred by Dr. Michelle Puente for management of small pancreatic cysts.  ** Family history of pancreatic cancer in her brother-- diagnosed at age 78.  Negative genetic testing.   She follows with hepatology for elevated liver enzymes.  Recently stopped drinking alcohol.  Hep B and C negative.   MRI abdomen 9/15/20 0.7 cm cystic lesion uncinate process of pancreas MRI 8/8/22 2 small pancreatic cysts- 7mm, 3mm and mildly dilated side branch   MRI 8/9/23 An 8 mm pancreatic uncinate process cyst without significant change from prior imaging dating to September 2020. Mild side branch ductal dilatation in the pancreatic body, also unchanged. Pancreatic duct is normal in caliber.  She takes prednisone for temporal arteritis. Has developed central obesity. She was down to 1 mg but had to incrase for flare.Was considering ozempic.  PMHx:  hypothyroid hypertension hyperlipidemia ploymyalgia rheumatic  foot trauma  PSHx:  Hysterectomy Ex lap X 2 for small bowel obstructive secondary to adhesions  Medications:  amodipine lisinopril Prednisone Synthroid Addiitonal medications reviewed

## 2023-09-08 NOTE — ASSESSMENT
[FreeTextEntry1] : 76F referred for management of small pancreatic cysts.  8mm and 3 mm on MRI Aug 2023. Stable.  No worrisome features/ high risk stigmata.   I discussed that pancreatic cysts can have a range of pathologies and although most are benign, there is a risk of malignancy. We discussed that the relatively small size, absence of main duct involvement, and lack of solid components of her cysts are reassuring, but there is still a small risk developing pancreatic cancer. This risk warrants continued surveillance as long as she is healthy enough to pursue any therapy- such as resection, chemotherapy, or radiation.   In addition, her risk may be elevated based on first degree family members with pancreatic cancer (brother). We discussed that she meets criteria for genetic testing based on a first degree relative. If a germline mutation was identified recommendation would still be continued annual surveillance, and so would not necessarily change the intensity of her surveillance.   I discussed symptoms that would raise my concern for malignant transformation including weight loss, jaundice, abdominal/ back pain, and new onset diabetes. Should these develop, she should call immediately.   In the absence of new symptoms I recommend continued surveillance with annual MRI. I discussed that at some point in the future it may be worthwhile to consider endoscopic ultrasound as this modality may identify findings not apparent on MRI- such as occult mural nodules. I do not think this is necessary in the near future. I also discussed that our surveillance recommendations may change over time as the literature continues to evolve.   Plan:  Genetics referral  Follow up with MRI in 1 year Follow up cyst clinic after MRI Can consider spacing to 2 years next time

## 2023-09-08 NOTE — PHYSICAL EXAM
[FreeTextEntry1] : General: No acute distress. Well nourished and well kempt.\par  Head: AT/NC\par  Eyes: PERRL. EOMI. No scleral icterus\par  Pulmonary: No respiratory distress. Clear to auscultation bilaterally. No wheezes, rales, or rhonchi. \par  CV: Regular rate and rhythm. Normal S1, S2. No murmurs. No chest wall tenderness. Distal pulses intact. Good capillary refill.\par  ABD: Soft. NT/ND. No rebound, no guarding, no rigidity. No peritoneal signs. No masses.  \par  Extremities: Warm. No edema. 2+ pulses.\par  Neurological: A&O x 4.\par  Psychiatric: Normal affect and mood.\par  \par

## 2024-04-30 ENCOUNTER — OFFICE (OUTPATIENT)
Dept: URBAN - METROPOLITAN AREA CLINIC 27 | Facility: CLINIC | Age: 79
Setting detail: OPHTHALMOLOGY
End: 2024-04-30
Payer: MEDICARE

## 2024-04-30 DIAGNOSIS — H04.223: ICD-10-CM

## 2024-04-30 DIAGNOSIS — H18.413: ICD-10-CM

## 2024-04-30 DIAGNOSIS — H11.823: ICD-10-CM

## 2024-04-30 DIAGNOSIS — H02.135: ICD-10-CM

## 2024-04-30 DIAGNOSIS — H02.132: ICD-10-CM

## 2024-04-30 DIAGNOSIS — L23.3: ICD-10-CM

## 2024-04-30 DIAGNOSIS — M31.6: ICD-10-CM

## 2024-04-30 PROCEDURE — 99213 OFFICE O/P EST LOW 20 MIN: CPT | Performed by: OPHTHALMOLOGY

## 2024-04-30 ASSESSMENT — LID EXAM ASSESSMENTS
OD_BLEPHARITIS: RLL RUL 3+
OS_BLEPHARITIS: 2+

## 2024-04-30 ASSESSMENT — LID POSITION - ECTROPION
OS_ECTROPION: LLL
OD_ECTROPION: RLL

## 2024-05-03 ENCOUNTER — RX ONLY (RX ONLY)
Age: 79
End: 2024-05-03

## 2024-05-03 ENCOUNTER — OFFICE (OUTPATIENT)
Dept: URBAN - METROPOLITAN AREA CLINIC 35 | Facility: CLINIC | Age: 79
Setting detail: OPHTHALMOLOGY
End: 2024-05-03
Payer: MEDICARE

## 2024-05-03 DIAGNOSIS — H04.213: ICD-10-CM

## 2024-05-03 DIAGNOSIS — H11.823: ICD-10-CM

## 2024-05-03 DIAGNOSIS — L23.3: ICD-10-CM

## 2024-05-03 DIAGNOSIS — H02.535: ICD-10-CM

## 2024-05-03 DIAGNOSIS — H02.532: ICD-10-CM

## 2024-05-03 PROBLEM — H02.831 DERMATOCHALASIS; RIGHT UPPER LID, LEFT UPPER LID: Status: ACTIVE | Noted: 2024-05-03

## 2024-05-03 PROBLEM — H01.001 BLEPHARITIS; RIGHT UPPER LID, LEFT UPPER LID: Status: ACTIVE | Noted: 2024-05-03

## 2024-05-03 PROBLEM — H10.89 ROSACEA CONJUNCTIVITIS: Status: ACTIVE | Noted: 2024-05-03

## 2024-05-03 PROBLEM — H01.004 BLEPHARITIS; RIGHT UPPER LID, LEFT UPPER LID: Status: ACTIVE | Noted: 2024-05-03

## 2024-05-03 PROBLEM — L71.0 ROSACEA ; BOTH EYES: Status: ACTIVE | Noted: 2024-05-03

## 2024-05-03 PROBLEM — H02.834 DERMATOCHALASIS; RIGHT UPPER LID, LEFT UPPER LID: Status: ACTIVE | Noted: 2024-05-03

## 2024-05-03 PROCEDURE — 99213 OFFICE O/P EST LOW 20 MIN: CPT | Mod: 25 | Performed by: OPHTHALMOLOGY

## 2024-05-03 PROCEDURE — 68840 EXPLORE/IRRIGATE TEAR DUCTS: CPT | Mod: 50 | Performed by: OPHTHALMOLOGY

## 2024-05-03 ASSESSMENT — LID EXAM ASSESSMENTS
OS_COMMENTS: 3+ LAXIT
OS_BLEPHARITIS: 3+
OD_BLEPHARITIS: 3+
OD_COMMENTS: 3+ LAXIT

## 2024-05-03 ASSESSMENT — CONFRONTATIONAL VISUAL FIELD TEST (CVF)
OS_FINDINGS: FULL
OD_FINDINGS: FULL

## 2024-05-28 ENCOUNTER — APPOINTMENT (OUTPATIENT)
Dept: GASTROENTEROLOGY | Facility: CLINIC | Age: 79
End: 2024-05-28
Payer: MEDICARE

## 2024-05-28 VITALS
OXYGEN SATURATION: 97 % | HEART RATE: 85 BPM | TEMPERATURE: 97.5 F | WEIGHT: 117 LBS | DIASTOLIC BLOOD PRESSURE: 70 MMHG | BODY MASS INDEX: 23.28 KG/M2 | SYSTOLIC BLOOD PRESSURE: 110 MMHG | HEIGHT: 59.5 IN

## 2024-05-28 DIAGNOSIS — Z12.11 ENCOUNTER FOR SCREENING FOR MALIGNANT NEOPLASM OF COLON: ICD-10-CM

## 2024-05-28 PROCEDURE — 99213 OFFICE O/P EST LOW 20 MIN: CPT

## 2024-05-28 RX ORDER — SODIUM SULFATE, POTASSIUM SULFATE AND MAGNESIUM SULFATE 1.6; 3.13; 17.5 G/177ML; G/177ML; G/177ML
17.5-3.13-1.6 SOLUTION ORAL
Qty: 1 | Refills: 0 | Status: ACTIVE | COMMUNITY
Start: 2024-05-28 | End: 1900-01-01

## 2024-05-28 NOTE — PHYSICAL EXAM

## 2024-05-28 NOTE — ASSESSMENT
[FreeTextEntry1] : Impression and plan patient came to the office today to arrange for colonoscopy.  There is a strong family history of colon cancer.  Colonoscopy years.  The risks benefits alternatives of discussed.  Further suggestions will follow

## 2024-06-07 ENCOUNTER — OFFICE (OUTPATIENT)
Dept: URBAN - METROPOLITAN AREA CLINIC 35 | Facility: CLINIC | Age: 79
Setting detail: OPHTHALMOLOGY
End: 2024-06-07
Payer: MEDICARE

## 2024-06-07 DIAGNOSIS — H02.532: ICD-10-CM

## 2024-06-07 DIAGNOSIS — H04.122: ICD-10-CM

## 2024-06-07 DIAGNOSIS — L23.3: ICD-10-CM

## 2024-06-07 DIAGNOSIS — H11.823: ICD-10-CM

## 2024-06-07 DIAGNOSIS — L71.0: ICD-10-CM

## 2024-06-07 DIAGNOSIS — H02.535: ICD-10-CM

## 2024-06-07 DIAGNOSIS — H04.121: ICD-10-CM

## 2024-06-07 DIAGNOSIS — H02.831: ICD-10-CM

## 2024-06-07 DIAGNOSIS — H02.834: ICD-10-CM

## 2024-06-07 DIAGNOSIS — H04.213: ICD-10-CM

## 2024-06-07 DIAGNOSIS — M31.6: ICD-10-CM

## 2024-06-07 PROBLEM — H10.45 ALLERGIC CONJUNCTIVITIS: Status: ACTIVE | Noted: 2024-06-07

## 2024-06-07 PROCEDURE — 83861 MICROFLUID ANALY TEARS: CPT | Mod: QW,LT | Performed by: OPHTHALMOLOGY

## 2024-06-07 PROCEDURE — 99213 OFFICE O/P EST LOW 20 MIN: CPT | Performed by: OPHTHALMOLOGY

## 2024-06-07 PROCEDURE — 83861 MICROFLUID ANALY TEARS: CPT | Mod: QW,RT | Performed by: OPHTHALMOLOGY

## 2024-06-07 ASSESSMENT — LID EXAM ASSESSMENTS
OS_BLEPHARITIS: 3+
OD_COMMENTS: 3+ LAXIT
OS_COMMENTS: 3+ LAXIT
OD_BLEPHARITIS: 3+

## 2024-06-07 ASSESSMENT — CONFRONTATIONAL VISUAL FIELD TEST (CVF)
OS_FINDINGS: FULL
OD_FINDINGS: FULL

## 2024-06-27 ENCOUNTER — APPOINTMENT (OUTPATIENT)
Dept: GASTROENTEROLOGY | Facility: AMBULATORY MEDICAL SERVICES | Age: 79
End: 2024-06-27
Payer: MEDICARE

## 2024-06-27 PROCEDURE — 45380 COLONOSCOPY AND BIOPSY: CPT | Mod: PT

## 2024-08-26 ENCOUNTER — APPOINTMENT (OUTPATIENT)
Dept: MRI IMAGING | Facility: CLINIC | Age: 79
End: 2024-08-26
Payer: MEDICARE

## 2024-08-26 ENCOUNTER — OUTPATIENT (OUTPATIENT)
Dept: OUTPATIENT SERVICES | Facility: HOSPITAL | Age: 79
LOS: 1 days | End: 2024-08-26
Payer: MEDICARE

## 2024-08-26 DIAGNOSIS — Z98.89 OTHER SPECIFIED POSTPROCEDURAL STATES: Chronic | ICD-10-CM

## 2024-08-26 DIAGNOSIS — Z90.710 ACQUIRED ABSENCE OF BOTH CERVIX AND UTERUS: Chronic | ICD-10-CM

## 2024-08-26 DIAGNOSIS — K86.2 CYST OF PANCREAS: ICD-10-CM

## 2024-08-26 DIAGNOSIS — K66.0 PERITONEAL ADHESIONS (POSTPROCEDURAL) (POSTINFECTION): Chronic | ICD-10-CM

## 2024-08-26 PROCEDURE — 74183 MRI ABD W/O CNTR FLWD CNTR: CPT | Mod: 26,MH

## 2024-08-26 PROCEDURE — 74183 MRI ABD W/O CNTR FLWD CNTR: CPT

## 2024-08-26 PROCEDURE — A9585: CPT

## 2024-09-01 PROBLEM — Z80.0 FAMILY HISTORY OF PANCREATIC CANCER: Status: ACTIVE | Noted: 2024-09-01

## 2024-09-02 ENCOUNTER — NON-APPOINTMENT (OUTPATIENT)
Age: 79
End: 2024-09-02

## 2024-09-03 ENCOUNTER — APPOINTMENT (OUTPATIENT)
Dept: SURGICAL ONCOLOGY | Facility: CLINIC | Age: 79
End: 2024-09-03
Payer: MEDICARE

## 2024-09-03 VITALS
RESPIRATION RATE: 16 BRPM | BODY MASS INDEX: 23.47 KG/M2 | DIASTOLIC BLOOD PRESSURE: 83 MMHG | HEART RATE: 87 BPM | WEIGHT: 118 LBS | SYSTOLIC BLOOD PRESSURE: 148 MMHG | OXYGEN SATURATION: 91 % | HEIGHT: 59.5 IN

## 2024-09-03 DIAGNOSIS — Z80.0 FAMILY HISTORY OF MALIGNANT NEOPLASM OF DIGESTIVE ORGANS: ICD-10-CM

## 2024-09-03 PROCEDURE — 99214 OFFICE O/P EST MOD 30 MIN: CPT

## 2024-09-03 NOTE — END OF VISIT
[FreeTextEntry3] : By signing my name below, I, Mekaidenallen Humphrey, attest that this documentation has been prepared under the direction and in the presence of Beverly Perez MD in the following sections HISTORY OF PRESENT ILLNESS; PAST MEDICAL/FAMILY/SOCIAL HISTORY; REVIEW OF SYSTEMS; PHYSICAL EXAM; ASSESSMENT/PLAN.

## 2024-09-03 NOTE — HISTORY OF PRESENT ILLNESS
[de-identified] : Ms. HUSSEIN PRUITT is an 78-year-old female who presents for 1 year follow-up regarding management of small pancreatic cysts. Originally referred by Dr. Michelle Puente. ** Family history of pancreatic cancer in her brother-- diagnosed at age 78.  Negative genetic testing.   She follows with hepatology for elevated liver enzymes.  Recently stopped drinking alcohol.  Hep B and C negative.   MRI abdomen 9/15/20 0.7 cm cystic lesion uncinate process of pancreas MRI 8/8/22 2 small pancreatic cysts- 7mm, 3mm and mildly dilated side branch  MRI 8/9/23 An 8 mm pancreatic uncinate process cyst without significant change from prior imaging dating to September 2020. Mild side branch ductal dilatation in the pancreatic body, also unchanged. Pancreatic duct is normal in caliber.  MRI 8/26/2024 demonstrated that since September 15, 2020, unchanged cystic lesions throughout the pancreas, measuring up to 0.9 cm in the uncinate process. Unchanged mildly dilated side branch duct in the main pancreatic body.  PMHx: hypothyroid, HTN, HLD, polymyalgia rheumatic, foot trauma, Crohn's disease, temporal arteritis (previously on Prednisone x6 years, now stopped) She had taken prednisone for temporal arteritis x6 years, stopped as of 2024. Has developed central obesity. She was down to 1 mg but had to increase for flareup. Was considering Ozempic. PSHx: Hysterectomy, exploratory lap x2 for small bowel obstructive secondary to adhesions Medications: amlodipine, lisinopril, Prednisone, Synthroid FMHx: pancreatic cancer in her brother-- diagnosed at age 78. Patient with negative genetic testing.  Patient presents for 1 year follow-up. Patient has been feeling well overall, eating well and moving her bowels without difficulty. Reports recently stopping chronic Prednisone use x6 years for temporal arteritis. She was told to stop taking Lipitor by hepatology and GI.  She had a normal colonoscopy in June which was WNL.

## 2024-09-03 NOTE — HISTORY OF PRESENT ILLNESS
[de-identified] : Ms. HUSSEIN PRUITT is an 78-year-old female who presents for 1 year follow-up regarding management of small pancreatic cysts. Originally referred by Dr. Michelle Puente. ** Family history of pancreatic cancer in her brother-- diagnosed at age 78.  Negative genetic testing.   She follows with hepatology for elevated liver enzymes.  Recently stopped drinking alcohol.  Hep B and C negative.   MRI abdomen 9/15/20 0.7 cm cystic lesion uncinate process of pancreas MRI 8/8/22 2 small pancreatic cysts- 7mm, 3mm and mildly dilated side branch  MRI 8/9/23 An 8 mm pancreatic uncinate process cyst without significant change from prior imaging dating to September 2020. Mild side branch ductal dilatation in the pancreatic body, also unchanged. Pancreatic duct is normal in caliber.  MRI 8/26/2024 demonstrated that since September 15, 2020, unchanged cystic lesions throughout the pancreas, measuring up to 0.9 cm in the uncinate process. Unchanged mildly dilated side branch duct in the main pancreatic body.  PMHx: hypothyroid, HTN, HLD, polymyalgia rheumatic, foot trauma, Crohn's disease, temporal arteritis (previously on Prednisone x6 years, now stopped) She had taken prednisone for temporal arteritis x6 years, stopped as of 2024. Has developed central obesity. She was down to 1 mg but had to increase for flareup. Was considering Ozempic. PSHx: Hysterectomy, exploratory lap x2 for small bowel obstructive secondary to adhesions Medications: amlodipine, lisinopril, Prednisone, Synthroid FMHx: pancreatic cancer in her brother-- diagnosed at age 78. Patient with negative genetic testing.  Patient presents for 1 year follow-up. Patient has been feeling well overall, eating well and moving her bowels without difficulty. Reports recently stopping chronic Prednisone use x6 years for temporal arteritis. She was told to stop taking Lipitor by hepatology and GI.  She had a normal colonoscopy in June which was WNL.

## 2024-09-03 NOTE — ASSESSMENT
[FreeTextEntry1] : 78F referred for management of small pancreatic cysts.  0.9 cm in the uncinate process and unchanged mildly dilated sidebranch duct in the pancreatic body on MRI Aug 2024. Stable since September 15, 2020. No worrisome features/ high risk stigmata.   I discussed symptoms that would raise my concern for malignant transformation including weight loss, jaundice, abdominal/ back pain, and new onset diabetes. Should these develop, she should call immediately. In the absence of new symptoms, I recommend spacing out surveillance with MRI to every 2 years.  Plan:  MRI in 2 years F/u in person at that time as per patient's preference (w me)

## 2024-09-09 ENCOUNTER — OFFICE (OUTPATIENT)
Dept: URBAN - METROPOLITAN AREA CLINIC 27 | Facility: CLINIC | Age: 79
Setting detail: OPHTHALMOLOGY
End: 2024-09-09
Payer: MEDICARE

## 2024-09-09 DIAGNOSIS — H11.823: ICD-10-CM

## 2024-09-09 DIAGNOSIS — L71.0: ICD-10-CM

## 2024-09-09 DIAGNOSIS — H10.45: ICD-10-CM

## 2024-09-09 DIAGNOSIS — M31.6: ICD-10-CM

## 2024-09-09 DIAGNOSIS — L23.3: ICD-10-CM

## 2024-09-09 DIAGNOSIS — H02.831: ICD-10-CM

## 2024-09-09 DIAGNOSIS — H02.834: ICD-10-CM

## 2024-09-09 DIAGNOSIS — H02.535: ICD-10-CM

## 2024-09-09 DIAGNOSIS — H02.532: ICD-10-CM

## 2024-09-09 DIAGNOSIS — H16.223: ICD-10-CM

## 2024-09-09 DIAGNOSIS — H04.213: ICD-10-CM

## 2024-09-09 DIAGNOSIS — H02.824: ICD-10-CM

## 2024-09-09 PROBLEM — H01.002 BLEPHARITIS; RIGHT UPPER LID, LEFT UPPER LID , RIGHT LOWER LID, LEFT LOWER LID: Status: ACTIVE | Noted: 2024-09-09

## 2024-09-09 PROBLEM — H01.005 BLEPHARITIS; RIGHT UPPER LID, LEFT UPPER LID , RIGHT LOWER LID, LEFT LOWER LID: Status: ACTIVE | Noted: 2024-09-09

## 2024-09-09 PROBLEM — H01.001 BLEPHARITIS; RIGHT UPPER LID, LEFT UPPER LID , RIGHT LOWER LID, LEFT LOWER LID: Status: ACTIVE | Noted: 2024-09-09

## 2024-09-09 PROBLEM — H01.004 BLEPHARITIS; RIGHT UPPER LID, LEFT UPPER LID , RIGHT LOWER LID, LEFT LOWER LID: Status: ACTIVE | Noted: 2024-09-09

## 2024-09-09 PROCEDURE — 99213 OFFICE O/P EST LOW 20 MIN: CPT | Performed by: OPHTHALMOLOGY

## 2024-09-09 ASSESSMENT — LID EXAM ASSESSMENTS
OD_BLEPHARITIS: RLL RUL 3+
OD_COMMENTS: 3+ LAXIT
OS_BLEPHARITIS: LLL LUL 3+
OS_COMMENTS: 3+ LAXIT